# Patient Record
Sex: FEMALE | NOT HISPANIC OR LATINO | Employment: OTHER | URBAN - METROPOLITAN AREA
[De-identification: names, ages, dates, MRNs, and addresses within clinical notes are randomized per-mention and may not be internally consistent; named-entity substitution may affect disease eponyms.]

---

## 2024-08-21 ENCOUNTER — OFFICE VISIT (OUTPATIENT)
Dept: FAMILY MEDICINE | Facility: CLINIC | Age: 69
End: 2024-08-21
Payer: COMMERCIAL

## 2024-08-21 VITALS
BODY MASS INDEX: 20.59 KG/M2 | RESPIRATION RATE: 16 BRPM | WEIGHT: 116.2 LBS | HEIGHT: 63 IN | SYSTOLIC BLOOD PRESSURE: 118 MMHG | TEMPERATURE: 97.6 F | OXYGEN SATURATION: 98 % | DIASTOLIC BLOOD PRESSURE: 76 MMHG | HEART RATE: 69 BPM

## 2024-08-21 DIAGNOSIS — R10.13 EPIGASTRIC PAIN: Primary | ICD-10-CM

## 2024-08-21 DIAGNOSIS — G47.411 NARCOLEPSY WITH CATAPLEXY: ICD-10-CM

## 2024-08-21 DIAGNOSIS — F17.210 CIGARETTE NICOTINE DEPENDENCE WITHOUT COMPLICATION: ICD-10-CM

## 2024-08-21 DIAGNOSIS — J43.9 PULMONARY EMPHYSEMA, UNSPECIFIED EMPHYSEMA TYPE (H): ICD-10-CM

## 2024-08-21 DIAGNOSIS — I51.81 TAKOTSUBO CARDIOMYOPATHY: ICD-10-CM

## 2024-08-21 DIAGNOSIS — E78.00 PURE HYPERCHOLESTEROLEMIA: ICD-10-CM

## 2024-08-21 DIAGNOSIS — Z87.39 HISTORY OF ROTATOR CUFF TEAR: ICD-10-CM

## 2024-08-21 DIAGNOSIS — Z76.89 ENCOUNTER TO ESTABLISH CARE: ICD-10-CM

## 2024-08-21 DIAGNOSIS — G89.29 OTHER CHRONIC PAIN: ICD-10-CM

## 2024-08-21 PROBLEM — M35.9 DISORDER OF CONNECTIVE TISSUE (H): Status: ACTIVE | Noted: 2020-09-09

## 2024-08-21 PROCEDURE — 99204 OFFICE O/P NEW MOD 45 MIN: CPT | Performed by: STUDENT IN AN ORGANIZED HEALTH CARE EDUCATION/TRAINING PROGRAM

## 2024-08-21 RX ORDER — TEMAZEPAM 15 MG/1
15 CAPSULE ORAL
COMMUNITY
Start: 2023-09-11

## 2024-08-21 RX ORDER — ATORVASTATIN CALCIUM 10 MG/1
10 TABLET, FILM COATED ORAL DAILY
Qty: 30 TABLET | Refills: 1 | Status: SHIPPED | OUTPATIENT
Start: 2024-08-21 | End: 2024-09-16

## 2024-08-21 RX ORDER — CARVEDILOL 3.12 MG/1
3.12 TABLET ORAL 2 TIMES DAILY
COMMUNITY
End: 2024-08-21

## 2024-08-21 RX ORDER — CARVEDILOL 3.12 MG/1
3.12 TABLET ORAL 2 TIMES DAILY
Qty: 60 TABLET | Refills: 1 | Status: SHIPPED | OUTPATIENT
Start: 2024-08-21 | End: 2024-09-16

## 2024-08-21 RX ORDER — ATORVASTATIN CALCIUM 10 MG/1
10 TABLET, FILM COATED ORAL DAILY
COMMUNITY
End: 2024-08-21

## 2024-08-21 RX ORDER — ASPIRIN 81 MG/1
81 TABLET, CHEWABLE ORAL DAILY
COMMUNITY

## 2024-08-21 SDOH — HEALTH STABILITY: PHYSICAL HEALTH: ON AVERAGE, HOW MANY DAYS PER WEEK DO YOU ENGAGE IN MODERATE TO STRENUOUS EXERCISE (LIKE A BRISK WALK)?: 2 DAYS

## 2024-08-21 ASSESSMENT — PAIN SCALES - GENERAL: PAINLEVEL: NO PAIN (0)

## 2024-08-21 ASSESSMENT — SOCIAL DETERMINANTS OF HEALTH (SDOH): HOW OFTEN DO YOU GET TOGETHER WITH FRIENDS OR RELATIVES?: TWICE A WEEK

## 2024-08-21 NOTE — PROGRESS NOTES
Assessment & Plan     Epigastric pain  Patient reports chronic epigastric pain and what previously was described as a bulge that appeared with certain movements and had resolved. Previous evaluation by prior primary provider for possible hernia but this was ruled out by subsequent referral. Specialist consult by general surgery was doubtful of potential for hernia given location of symptoms and work up to that time and suggested most likely etiology was related to chest wall. However, GenSurg did suggest persistence or worsening of symptoms to be justification for further imaging and former primary had indicated plan to next refer to GI. Patient now reports similar pressure on opposite side though no noted bulging sensation appreciated and exam is benign. Patient admitted had not pursued follow up after her last interaction with former PCP some time ago so we discussed documented recommendations and patient agreeable to prior suggested course of referral to GI for further work up.  Referral to GI specialist for further evaluation and possible imaging. Aim to complete this before patient leaves for [place] in October if possible as patient leaves for 6 months of the year per her report.   - Adult GI  Referral - Consult Only; Future    Takotsubo cardiomyopathy  Patient had heart failure at age 62, diagnosed with Takotsubo cardiomyopathy. Currently stable on Carvedilol, baby aspirin, and CoQ10.  Refill Carvedilol for two months until next visit.  - carvedilol (COREG) 3.125 MG tablet; Take 1 tablet (3.125 mg) by mouth 2 times daily.    Pure hypercholesterolemia  Patient has a history of high cholesterol currently on a statin  Check cholesterol levels at next visit for annual wellness to ensure patient is on correct dose of medication.  Refill statin for two months until next visit.  - atorvastatin (LIPITOR) 10 MG tablet; Take 1 tablet (10 mg) by mouth daily.    Pulmonary emphysema, unspecified emphysema type  (H)  Cigarette nicotine dependence without complication  Will discuss at AWV, consider cessation and LFT's/LC screens as indicated.    Narcolepsy with cataplexy  Patient has narcolepsy and is seeing a sleep doctor annually.  Continue current management.    History of rotator cuff tear  Other chronic pain  Encounter to establish care  Scheduling for return for AWV.    Nicotine/Tobacco Cessation  She reports that she has been smoking cigarettes. She does not have any smokeless tobacco history on file.  Nicotine/Tobacco Cessation Plan  Will review at AWV.      Counseling  Appropriate preventive services were addressed with this patient via screening, questionnaire, or discussion as appropriate for fall prevention, nutrition, physical activity, Tobacco-use cessation, social engagement, weight loss and cognition.  Checklist reviewing preventive services available has been given to the patient.  Reviewed patient's diet, addressing concerns and/or questions.   She is at risk for lack of exercise and has been provided with information to increase physical activity for the benefit of her well-being.   Discussed possible causes of fatigue. Updated plan of care.  Patient reported difficulty with activities of daily living were addressed today.Addressed any concerns about safety while driving.  Information on urinary incontinence and treatment options given to patient.       Maryellen Fung is a 69 year old, presenting for the following health issues:  Wellness Visit      8/21/2024     9:26 AM   Additional Questions   Roomed by Nata HERRERA   Patient presents to establish care. Visit was schedule as an AWV, however this is not appropriate today both due to the establishing care and as her last Medicare wellness visit was completed in September of 2023 and it would be too early to complete this today.     - Previous care at [facility] in the past    PMH  - HF (Deni LANDRY) at age 62,  "managed with Carvedilol, chewable baby aspirin, and CoQ10. Had been released to primary for continued management given stable control.   - Narcolepsy, managed by a neurologist/sleep doctor, medication listed  - High cholesterol, checked annually  - Chronic pain  - Cigarette smoker, current  - Emphysema  - Connective tissue disorder, thinks this was an incorrect diagnosis, saw rheumatology in the past, states thinks was possibly related to previous physical job  - Now chronic history of a \"bulge\" to R of midline epigastrium causing breathlessness when bending a certain way, started last year and was investigated by prior PCP, thought to have sounded like a hernia, but this was ruled out by GenSurg and suggested to be chest wall related. PCP noted recommendation for referral to GI if persisted or worsened.  - Patient describes now has feeling of pressure on the other side of the abdomen opposite where aforementioned issue was, R sided issue \"resolved\", new issue similar to previous \"bulge\" but without any bulge, so interested in referral based on prior eval and recommendations  - Previous imaging of chest in 2017 showed a pulmonary nodule which has since resolved; a few tiny scattered nodules in both lungs were stable and non-specific, no need for further surveillance of those  - Internal hemorrhoids and diverticulosis diagnosed in 2016 during a colonoscopy; one 4-millimeter polyp in sigmoid colon was removed  - Cologuard last summer 7/6/23 Negative  - Mammogram in 2016; subsequent mammograms done but not specified  - Past rotator cuff tear, no surgery done; feeling twisted since a fall in 2006    Past medical history  - History of heart failure at age 62, diagnosed with Takotsubo cardiomyopathy  - Narcolepsy  - High cholesterol  - Chronic pain  - Emphysema    Social history  - Current smoker  - Travels to [place] for winter months    Current medications  - Carvedilol   - Chewable baby aspirin   - CoQ10   - Temazepam " "(prescribed by sleep doctor)  - Atorvastatin    Lab results  Last cholesterol check was within the last year    Imaging results  - Previous imaging showed a mass in the lung, a nodule, which had since resolved. Tiny scattered nodules in both lungs, stable and non-specific, did not have indication for monitoring.  - Previous colonoscopy in 2016 showed internal hemorrhoids and diverticulosis. One 4-millimeter polyp in sigmoid colon was removed.      Review of Systems  Negative unless otherwise specified per HPI.        Objective    /76   Pulse 69   Temp 97.6  F (36.4  C) (Temporal)   Resp 16   Ht 1.595 m (5' 2.8\")   Wt 52.7 kg (116 lb 3.2 oz)   SpO2 98%   BMI 20.72 kg/m    Body mass index is 20.72 kg/m .  Physical Exam   GENERAL: alert and no acute distress  EYES: Eyes grossly normal to inspection, PERRL and conjunctivae and sclerae normal  HENT: nose and mouth without ulcers or lesions  RESP: normal rate and effort  CV: regular rate, no peripheral edema  ABDOMEN: soft, nontender, non-distended, no bulge appreciated in the epigastrium.  MS: no gross musculoskeletal defects noted, no edema  SKIN: no suspicious lesions or rashes  NEURO: Normal strength and tone, mentation intact and speech normal  PSYCH: mentation appears normal, affect normal/bright        Signed Electronically by: Court Diana, DO  "

## 2024-09-16 ENCOUNTER — OFFICE VISIT (OUTPATIENT)
Dept: FAMILY MEDICINE | Facility: CLINIC | Age: 69
End: 2024-09-16
Payer: COMMERCIAL

## 2024-09-16 VITALS
TEMPERATURE: 96.4 F | SYSTOLIC BLOOD PRESSURE: 132 MMHG | OXYGEN SATURATION: 99 % | HEART RATE: 69 BPM | BODY MASS INDEX: 20.55 KG/M2 | DIASTOLIC BLOOD PRESSURE: 82 MMHG | HEIGHT: 63 IN | WEIGHT: 116 LBS | RESPIRATION RATE: 14 BRPM

## 2024-09-16 DIAGNOSIS — J43.9 PULMONARY EMPHYSEMA, UNSPECIFIED EMPHYSEMA TYPE (H): ICD-10-CM

## 2024-09-16 DIAGNOSIS — Z11.59 NEED FOR HEPATITIS C SCREENING TEST: ICD-10-CM

## 2024-09-16 DIAGNOSIS — Z29.11 NEED FOR VACCINATION AGAINST RESPIRATORY SYNCYTIAL VIRUS: ICD-10-CM

## 2024-09-16 DIAGNOSIS — J01.00 ACUTE MAXILLARY SINUSITIS, RECURRENCE NOT SPECIFIED: ICD-10-CM

## 2024-09-16 DIAGNOSIS — Z28.21 VACCINATION DECLINED: ICD-10-CM

## 2024-09-16 DIAGNOSIS — E78.00 PURE HYPERCHOLESTEROLEMIA: ICD-10-CM

## 2024-09-16 DIAGNOSIS — Z53.20 OSTEOPOROSIS SCREENING DECLINED: ICD-10-CM

## 2024-09-16 DIAGNOSIS — Z23 INFLUENZA VACCINATION ADMINISTERED AT CURRENT VISIT: ICD-10-CM

## 2024-09-16 DIAGNOSIS — Z13.1 SCREENING FOR DIABETES MELLITUS: ICD-10-CM

## 2024-09-16 DIAGNOSIS — J32.9 RECURRENT SINUS INFECTIONS: ICD-10-CM

## 2024-09-16 DIAGNOSIS — Z00.00 ENCOUNTER FOR MEDICARE ANNUAL WELLNESS EXAM: Primary | ICD-10-CM

## 2024-09-16 DIAGNOSIS — I51.81 TAKOTSUBO CARDIOMYOPATHY: ICD-10-CM

## 2024-09-16 DIAGNOSIS — J01.10 ACUTE FRONTAL SINUSITIS, RECURRENCE NOT SPECIFIED: ICD-10-CM

## 2024-09-16 DIAGNOSIS — R73.03 PREDIABETES: ICD-10-CM

## 2024-09-16 DIAGNOSIS — G47.411 NARCOLEPSY WITH CATAPLEXY: ICD-10-CM

## 2024-09-16 PROBLEM — M35.9 DISORDER OF CONNECTIVE TISSUE (H): Status: RESOLVED | Noted: 2020-09-09 | Resolved: 2024-09-16

## 2024-09-16 LAB
CHOLEST SERPL-MCNC: 141 MG/DL
FASTING STATUS PATIENT QL REPORTED: YES
HBA1C MFR BLD: 6 %
HCV AB SERPL QL IA: NONREACTIVE
HDLC SERPL-MCNC: 45 MG/DL
LDLC SERPL CALC-MCNC: 78 MG/DL
NONHDLC SERPL-MCNC: 96 MG/DL
TRIGL SERPL-MCNC: 91 MG/DL

## 2024-09-16 PROCEDURE — 99214 OFFICE O/P EST MOD 30 MIN: CPT | Mod: 25 | Performed by: STUDENT IN AN ORGANIZED HEALTH CARE EDUCATION/TRAINING PROGRAM

## 2024-09-16 PROCEDURE — 80061 LIPID PANEL: CPT | Performed by: STUDENT IN AN ORGANIZED HEALTH CARE EDUCATION/TRAINING PROGRAM

## 2024-09-16 PROCEDURE — 83036 HEMOGLOBIN GLYCOSYLATED A1C: CPT | Performed by: STUDENT IN AN ORGANIZED HEALTH CARE EDUCATION/TRAINING PROGRAM

## 2024-09-16 PROCEDURE — G0008 ADMIN INFLUENZA VIRUS VAC: HCPCS | Performed by: STUDENT IN AN ORGANIZED HEALTH CARE EDUCATION/TRAINING PROGRAM

## 2024-09-16 PROCEDURE — 90662 IIV NO PRSV INCREASED AG IM: CPT | Performed by: STUDENT IN AN ORGANIZED HEALTH CARE EDUCATION/TRAINING PROGRAM

## 2024-09-16 PROCEDURE — G0438 PPPS, INITIAL VISIT: HCPCS | Performed by: STUDENT IN AN ORGANIZED HEALTH CARE EDUCATION/TRAINING PROGRAM

## 2024-09-16 PROCEDURE — 86803 HEPATITIS C AB TEST: CPT | Performed by: STUDENT IN AN ORGANIZED HEALTH CARE EDUCATION/TRAINING PROGRAM

## 2024-09-16 PROCEDURE — 36415 COLL VENOUS BLD VENIPUNCTURE: CPT | Performed by: STUDENT IN AN ORGANIZED HEALTH CARE EDUCATION/TRAINING PROGRAM

## 2024-09-16 RX ORDER — ATORVASTATIN CALCIUM 10 MG/1
10 TABLET, FILM COATED ORAL DAILY
Qty: 30 TABLET | Refills: 11 | Status: SHIPPED | OUTPATIENT
Start: 2024-09-16

## 2024-09-16 RX ORDER — CARVEDILOL 3.12 MG/1
3.12 TABLET ORAL 2 TIMES DAILY
Qty: 30 TABLET | Refills: 11 | Status: SHIPPED | OUTPATIENT
Start: 2024-09-16

## 2024-09-16 ASSESSMENT — PAIN SCALES - GENERAL: PAINLEVEL: NO PAIN (0)

## 2024-09-16 NOTE — PROGRESS NOTES
Preventive Care Visit  [facility]  Court Diana DO, Family Medicine  Sep 16, 2024      Assessment & Plan     Encounter for Medicare annual wellness exam  Recommend annual wellness visits, screens, and immunizations as indicated.     Recurrent sinus infections  Acute maxillary sinusitis, recurrence not specified  Acute frontal sinusitis, recurrence not specified  Given history of rapid progression and recurrence with exam c/w sinus ttp and noted PND and discharge, will treat with extended course. Advised to follow-up PRN if does not resolve or progresses.   - amoxicillin-clavulanate (AUGMENTIN) 875-125 MG tablet; Take 1 tablet by mouth 2 times daily.    Takutsubo cardiomyopathy  BP stable on prior regimen. Will refill for 1 year. Follow-up PRN.  - carvedilol (COREG) 3.125 MG tablet    Pure hypercholesterolemia  Lipid panel well controlled on statin. HDL is low, however. Recommend improve this with exercise and diet.   - atorvastatin (LIPITOR) 10 MG tablet  - Lipid panel reflex to direct LDL Fasting; Future  - Lipid panel reflex to direct LDL Fasting    Pulmonary emphysema, unspecified emphysema type (H)  Referral to pulm for update of PFTs and recs on any necessary management.   - Adult Pulmonary Medicine  Referral; Future    Narcolepsy with cataplexy  Stable on current regimen, though neurology to send trial of alternative medication suggested by insurer that may be covered. Patient to advise if change in regimen determined.     Prediabetes  Screening for diabetes mellitus  A1c elevated to prediabetes range at 6.0. Recommend exercise and avoid excess carbs. Can recheck, but no sooner than 3 months.   - Hemoglobin A1c; Future  - Hemoglobin A1c    Influenza vaccination administered at current visit    Need for hepatitis C screening test  Screen negative.   - Hepatitis C Screen Reflex to HCV RNA Quant and Genotype; Future  - Hepatitis C Screen Reflex to HCV RNA Quant and  Genotype    Need for vaccination against respiratory syncytial virus  Declined, can obtain at pharmacy if desired.     Osteoporosis screening declined  Vaccination declined    Counseling  Appropriate preventive services were addressed with this patient via screening, questionnaire, or discussion as appropriate for fall prevention, nutrition, physical activity, Tobacco-use cessation, social engagement, weight loss and cognition.  Checklist reviewing preventive services available has been given to the patient.  Reviewed patient's diet, addressing concerns and/or questions.   She is at risk for lack of exercise and has been provided with information to increase physical activity for the benefit of her well-being.     Maryellen Fung is a 69 year old, presenting for the following:  Wellness Visit        9/16/2024    10:58 AM   Additional Questions   Roomed by Chasity   Accompanied by        Health Care Directive  Patient does not have a Health Care Directive or Living Will: Discussed advance care planning with patient; information given to patient to review.    HPI    Day 5 of symptoms of sinus HA suggestive of sinus infection.  Has history of recurrent sinus infections.  Very classical for her where she gets a bad smell in her nose and has required antibiotics.  Has received amox and augmentin in the past.  Patient reports if does not get treated promptly evolves quickly into severe sinus infection.     Agreeable to flu vaccination, declines others. Recommended RSV.    Updating lipid screen given hx of HLD. Will advise if needs dose adjustment.     Hx of emphysema, recommending PFTs, patient interested in seeing Pulm for PFTs and any recs. Can be managed by primary after.     Narcolepsy stable on prior meds. Patient states insurance won't cover her Restoril so for now she will pay OOP. Her Neurologist will be sending some alternatives that were suggested by insurance to try. She may swicth if they work.           9/16/2024   General Health   How would you rate your overall physical health? Good   Feel stress (tense, anxious, or unable to sleep) Not at all         9/16/2024   Nutrition   Diet: Regular (no restrictions)          9/16/2024   Exercise   Days per week of moderate/strenous exercise 1 day   Average minutes spent exercising at this level 10 min      (!) EXERCISE CONCERN      9/16/2024   Social Factors   Frequency of gathering with friends or relatives Twice a week   Worry food won't last until get money to buy more No   Food not last or not have enough money for food? No   Do you have housing? (Housing is defined as stable permanent housing and does not include staying ouside in a car, in a tent, in an abandoned building, in an overnight shelter, or couch-surfing.) Yes   Are you worried about losing your housing? No   Lack of transportation? No   Unable to get utilities (heat,electricity)? No         9/16/2024   Fall Risk   Fallen 2 or more times in the past year? No   Trouble with walking or balance? No             9/16/2024   Activities of Daily Living- Home Safety   Needs help with the following daily activites None of the above   Safety concerns in the home None of the above            9/16/2024   Dental   Dentist two times every year? Yes            9/16/2024   Hearing Screening   Hearing concerns? None of the above            9/16/2024   Driving Risk Screening   Patient/family members have concerns about driving No            9/16/2024   General Alertness/Fatigue Screening   Have you been more tired than usual lately? No            9/16/2024   Urinary Incontinence Screening   Bothered by leaking urine in past 6 months No          8/21/2024   TB Screening   Were you born outside of the [place]? No            Today's PHQ-2 Score:       9/16/2024    10:57 AM   PHQ-2 ( 1999 Pf[employer])   Q1: Little interest or pleasure in doing things 0   Q2: Feeling down, depressed or hopeless 0   PHQ-2 Score 0   Q1: Little interest or  pleasure in doing things Not at all   Q2: Feeling down, depressed or hopeless Not at all   PHQ-2 Score 0           9/16/2024   Substance Use   If I could quit smoking, I would Completely agree   I want to quit somking, worry about health affects Completely agree   Willing to make a plan to quit smoking Completely agree   Willing to cut down before quitting Completely agree   Alcohol more than 3/day or more than 7/wk No   Do you have a current opioid prescription? No   How severe/bad is pain from 1 to 10? 0/10 (No Pain)   Do you use any other substances recreationally? No        Social History     Tobacco Use    Smoking status: Every Day     Types: Cigarettes    Tobacco comments:     15 cigarettes per day   Vaping Use    Vaping status: Never Used      Mammogram Screening - Mammogram every 1-2 years updated in Health Maintenance based on mutual decision making  No family or personal history. Last was 2023 and reports was normal. So next recommended in 2025.     History of abnormal Pap smear: No and has aged out, told did not need anymore by prior provider.        ASCVD Risk   The 10-year ASCVD risk score (Jas BLAKE, et al., 2019) is: 11.6%    Values used to calculate the score:      Age: 69 years      Sex: Female      Is Non- : No      Diabetic: No      Tobacco smoker: Yes      Systolic Blood Pressure: 132 mmHg      Is BP treated: No      HDL Cholesterol: 45 mg/dL      Total Cholesterol: 141 mg/dL      Reviewed and updated as needed this visit by Provider   Tobacco  Allergies  Meds  Problems  Med Hx  Surg Hx  Fam Hx     Sexual Activity          History reviewed. No pertinent past medical history.  History reviewed. No pertinent surgical history.  OB History   No obstetric history on file.     Labs reviewed in EPIC  BP Readings from Last 3 Encounters:   09/16/24 132/82   08/21/24 118/76   01/24/12 123/71    Wt Readings from Last 3 Encounters:   09/16/24 52.6 kg (116 lb)    08/21/24 52.7 kg (116 lb 3.2 oz)   01/24/12 56.2 kg (124 lb)                  Patient Active Problem List   Diagnosis    Narcolepsy with cataplexy    Chronic pain    Cigarette nicotine dependence without complication    Pulmonary emphysema (H)    Takotsubo cardiomyopathy    Pure hypercholesterolemia    History of rotator cuff tear    Epigastric pain    Lung mass     History reviewed. No pertinent surgical history.    Social History     Tobacco Use    Smoking status: Every Day     Types: Cigarettes    Smokeless tobacco: Not on file    Tobacco comments:     15 cigarettes per day   Substance Use Topics    Alcohol use: Not on file     History reviewed. No pertinent family history.      Current Outpatient Medications   Medication Sig Dispense Refill    amoxicillin-clavulanate (AUGMENTIN) 875-125 MG tablet Take 1 tablet by mouth 2 times daily. 20 tablet 0    aspirin (ASA) 81 MG chewable tablet Take 81 mg by mouth daily.      atorvastatin (LIPITOR) 10 MG tablet Take 1 tablet (10 mg) by mouth daily. 30 tablet 11    carvedilol (COREG) 3.125 MG tablet Take 1 tablet (3.125 mg) by mouth 2 times daily. 30 tablet 11    temazepam (RESTORIL) 15 MG capsule Take 15 mg by mouth nightly as needed for sleep.       Allergies   Allergen Reactions    Clomipramine     Imipramine     Methylphenidate     No Clinical Screening - See Comments      Nuvigil, venlafaxine,    Sulfa Antibiotics     Vivactil [Protriptyline Hcl]      Recent Labs   Lab Test 09/16/24  1207   A1C 6.0*   LDL 78   HDL 45*   TRIG 91      Current providers sharing in care for this patient include:  Patient Care Team:  No Ref-Primary, Physician as PCP - Court Hernandez DO as Assigned PCP    The following health maintenance items are reviewed in Epic and correct as of today:  Health Maintenance   Topic Date Due    DEXA  Never done    SPIROMETRY  Never done    COPD ACTION PLAN  Never done    GLUCOSE  Never done    RSV VACCINE (1 - Risk 60-74 years 1-dose series) Never  "done    LUNG CANCER SCREENING  02/19/2018    COVID-19 Vaccine (4 - 2024-25 season) 09/01/2024    Pneumococcal Vaccine: 65+ Years (3 of 3 - PPSV23 or PCV20) 10/28/2024    MAMMO SCREENING  06/01/2025    NICOTINE/TOBACCO CESSATION COUNSELING Q 1 YR  08/21/2025    ANNUAL REVIEW OF HM ORDERS  08/21/2025    MEDICARE ANNUAL WELLNESS VISIT  09/16/2025    LIPID  09/16/2025    FALL RISK ASSESSMENT  09/16/2025    COLORECTAL CANCER SCREENING  12/28/2026    ADVANCE CARE PLANNING  09/16/2029    DTAP/TDAP/TD IMMUNIZATION (3 - Td or Tdap) 09/09/2030    HEPATITIS C SCREENING  Completed    PHQ-2 (once per calendar year)  Completed    INFLUENZA VACCINE  Completed    ZOSTER IMMUNIZATION  Completed    HPV IMMUNIZATION  Aged Out    MENINGITIS IMMUNIZATION  Aged Out    RSV MONOCLONAL ANTIBODY  Aged Out         Review of Systems  Negative unless otherwise specified per HPI.       Objective    Exam  /82   Pulse 69   Temp (!) 96.4  F (35.8  C) (Temporal)   Resp 14   Ht 1.6 m (5' 2.99\")   Wt 52.6 kg (116 lb)   LMP  (LMP Unknown)   SpO2 99%   Breastfeeding No   BMI 20.55 kg/m     Estimated body mass index is 20.55 kg/m  as calculated from the following:    Height as of this encounter: 1.6 m (5' 2.99\").    Weight as of this encounter: 52.6 kg (116 lb).    Physical Exam  GENERAL: alert and no distress  EYES: Eyes grossly normal to inspection, PERRL and conjunctivae and sclerae normal  HENT: ear canals and TM's normal, nose and mouth without ulcers or lesions, ttp overlying frontal and maxillary sinuses b/l, mild nasal erythema w/o significant discharge, post nasal drip noted in posterior oropharynx without notable erythema of pharynx or tonsils, no tonsillar swelling.   NECK: no adenopathy, no asymmetry, masses, or scars  RESP: lungs clear to auscultation - no rales, rhonchi or wheezes, normal rate and effort  CV: regular rate, no peripheral edema  ABDOMEN: soft, nontender, no hepatosplenomegaly, no masses and bowel sounds " normal  MS: no gross musculoskeletal defects noted, no edema  SKIN: no suspicious lesions or rashes  NEURO: Normal strength and tone, mentation intact and speech normal  PSYCH: mentation appears normal, affect normal/bright        9/16/2024   Mini Cog   Clock Draw Score 2 Normal   3 Item Recall 3 objects recalled   Mini Cog Total Score 5             Signed Electronically by: Court Diana DO

## 2024-09-17 ENCOUNTER — TELEPHONE (OUTPATIENT)
Dept: FAMILY MEDICINE | Facility: CLINIC | Age: 69
End: 2024-09-17
Payer: COMMERCIAL

## 2024-09-17 NOTE — TELEPHONE ENCOUNTER
----- Message from Court Diana sent at 9/16/2024  8:03 PM CDT -----  Team - please call patient with results.    Cholesterol looks good aside from low HDL. Exercise can bring that up, but can also try to add or focus on foods in diet that provide a good source for this such as avocado, nuts, oatmeal, and olive oil.    A1c is elevated to prediabetes range. Exercise can also help with this, but also recommend avoiding excess carbs in diet.    Negative screen for hepatitis C.

## 2024-09-17 NOTE — PATIENT INSTRUCTIONS
Patient Education   Preventive Care Advice   This is general advice given by our system to help you stay healthy. However, your care team may have specific advice just for you. Please talk to your care team about your preventive care needs.  Nutrition  Eat 5 or more servings of fruits and vegetables each day.  Try wheat bread, brown rice and whole grain pasta (instead of white bread, rice, and pasta).  Get enough calcium and vitamin D. Check the label on foods and aim for 100% of the RDA (recommended daily allowance).  Lifestyle  Exercise at least 150 minutes each week  (30 minutes a day, 5 days a week).  Do muscle strengthening activities 2 days a week. These help control your weight and prevent disease.  No smoking.  Wear sunscreen to prevent skin cancer.  Have a dental exam and cleaning every 6 months.  Yearly exams  See your health care team every year to talk about:  Any changes in your health.  Any medicines your care team has prescribed.  Preventive care, family planning, and ways to prevent chronic diseases.  Shots (vaccines)   HPV shots (up to age 26), if you've never had them before.  Hepatitis B shots (up to age 59), if you've never had them before.  COVID-19 shot: Get this shot when it's due.  Flu shot: Get a flu shot every year.  Tetanus shot: Get a tetanus shot every 10 years.  Pneumococcal, hepatitis A, and RSV shots: Ask your care team if you need these based on your risk.  Shingles shot (for age 50 and up)  General health tests  Diabetes screening:  Starting at age 35, Get screened for diabetes at least every 3 years.  If you are younger than age 35, ask your care team if you should be screened for diabetes.  Cholesterol test: At age 39, start having a cholesterol test every 5 years, or more often if advised.  Bone density scan (DEXA): At age 50, ask your care team if you should have this scan for osteoporosis (brittle bones).  Hepatitis C: Get tested at least once in your life.  STIs (sexually  transmitted infections)  Before age 24: Ask your care team if you should be screened for STIs.  After age 24: Get screened for STIs if you're at risk. You are at risk for STIs (including HIV) if:  You are sexually active with more than one person.  You don't use condoms every time.  You or a partner was diagnosed with a sexually transmitted infection.  If you are at risk for HIV, ask about PrEP medicine to prevent HIV.  Get tested for HIV at least once in your life, whether you are at risk for HIV or not.  Cancer screening tests  Cervical cancer screening: If you have a cervix, begin getting regular cervical cancer screening tests starting at age 21.  Breast cancer scan (mammogram): If you've ever had breasts, begin having regular mammograms starting at age 40. This is a scan to check for breast cancer.  Colon cancer screening: It is important to start screening for colon cancer at age 45.  Have a colonoscopy test every 10 years (or more often if you're at risk) Or, ask your provider about stool tests like a FIT test every year or Cologuard test every 3 years.  To learn more about your testing options, visit:   .  For help making a decision, visit:   https://bit.ly/eu82986.  Prostate cancer screening test: If you have a prostate, ask your care team if a prostate cancer screening test (PSA) at age 55 is right for you.  Lung cancer screening: If you are a current or former smoker ages 50 to 80, ask your care team if ongoing lung cancer screenings are right for you.  For informational purposes only. Not to replace the advice of your health care provider. Copyright   2023 [facility]. All rights reserved. Clinically reviewed by the [facility]. [employer] [id number] - REV 01/24.  Learning About Being Physically Active  What is physical activity?     Being physically active means doing any kind of activity that gets your body moving.  The types of physical activity that can help you get  "fit and stay healthy include:  Aerobic or \"cardio\" activities. These make your heart beat faster and make you breathe harder, such as brisk walking, riding a bike, or running. They strengthen your heart and lungs and build up your endurance.  Strength training activities. These make your muscles work against, or \"resist,\" something. Examples include lifting weights or doing push-ups. These activities help tone and strengthen your muscles and bones.  Stretches. These let you move your joints and muscles through their full range of motion. Stretching helps you be more flexible.  Reaching a balance between these three types of physical activity is important because each one contributes to your overall fitness.  What are the benefits of being active?  Being active is one of the best things you can do for your health. It helps you to:  Feel stronger and have more energy to do all the things you like to do.  Focus better at school or work.  Feel, think, and sleep better.  Reach and stay at a healthy weight.  Lose fat and build lean muscle.  Lower your risk for serious health problems, including diabetes, heart attack, high blood pressure, and some cancers.  Keep your heart, lungs, bones, muscles, and joints strong and healthy.  How can you make being active part of your life?  Start slowly. Make it your long-term goal to get at least 30 minutes of exercise on most days of the week. Walking is a good choice. You also may want to do other activities, such as running, swimming, cycling, or playing tennis or team sports.  Pick activities that you like--ones that make your heart beat faster, your muscles stronger, and your muscles and joints more flexible. If you find more than one thing you like doing, do them all. You don't have to do the same thing every day.  Get your heart pumping every day. Any activity that makes your heart beat faster and keeps it at that rate for a while counts.  Here are some great ways to get your " "heart beating faster:  Go for a brisk walk, run, or hike.  Go for a swim or bike ride.  Take an online exercise class or dance.  Play a game of touch football, basketball, or soccer.  Play tennis, pickleball, or racquetball.  Climb stairs.  Even some household chores can be aerobic. Just do them at a faster pace. Raking or mowing the lawn, sweeping the garage, and vacuuming and cleaning your home all can help get your heart rate up.  Strengthen your muscles during the week. You don't have to lift heavy weights or grow big, bulky muscles to get stronger. Doing a few simple activities that make your muscles work against, or \"resist,\" something can help you get stronger. Aim for at least twice a week.  For example, you can:  Do push-ups or sit-ups, which use your own body weight as resistance.  Lift weights or dumbbells or use stretch bands at home or in a gym or community center.  Stretch your muscles often. Stretching will help you as you become more active. It can help you stay flexible and loosen tight muscles. It can also help improve your balance and posture and can be a great way to relax.  Be sure to stretch the muscles you'll be using when you work out. It's best to warm your muscles slightly before you stretch them. Walk or do some other light aerobic activity for a few minutes. Then start stretching.  When you stretch your muscles:  Do it slowly. Stretching is not about going fast or making sudden movements.  Don't push or bounce during a stretch.  Hold each stretch for at least 15 to 30 seconds, if you can. You should feel a stretch in the muscle, but not pain.  Breathe out as you do the stretch. Then breathe in as you hold the stretch. Don't hold your breath.  If you're worried about how more activity might affect your health, have a checkup before you start. Follow any special advice your doctor gives you for getting a smart start.  Where can you learn more?  Go to " "https://www.PeopleAdmin.net/patiented  Enter W332 in the search box to learn more about \"Learning About Being Physically Active.\"  Current as of: June 5, 2023  Content Version: 14.1 2006-2024 [employer].   Care instructions adapted under license by your healthcare professional. If you have questions about a medical condition or this instruction, always ask your healthcare professional. [employer] disclaims any warranty or liability for your use of this information.    Learning About Being Active as an Older Adult  Why is being active important as you get older?     Being active is one of the best things you can do for your health. And it's never too late to start. Being active--or getting active, if you aren't already--has definite benefits. It can:  Give you more energy,  Keep your mind sharp.  Improve balance to reduce your risk of falls.  Help you manage chronic illness with fewer medicines.  No matter how old you are, how fit you are, or what health problems you have, there is a form of activity that will work for you. And the more physical activity you can do, the better your overall health will be.  What kinds of activity can help you stay healthy?  Being more active will make your daily activities easier. Physical activity includes planned exercise and things you do in daily life. There are four types of activity:  Aerobic.  Doing aerobic activity makes your heart and lungs strong.  Includes walking, dancing, and gardening.  Aim for at least 2  hours spread throughout the week.  It improves your energy and can help you sleep better.  Muscle-strengthening.  This type of activity can help maintain muscle and strengthen bones.  Includes climbing stairs, using resistance bands, and lifting or carrying heavy loads.  Aim for at least twice a week.  It can help protect the knees and other joints.  Stretching.  Stretching gives you better range of motion in joints and muscles.  Includes " upper arm stretches, calf stretches, and gentle yoga.  Aim for at least twice a week, preferably after your muscles are warmed up from other activities.  It can help you function better in daily life.  Balancing.  This helps you stay coordinated and have good posture.  Includes heel-to-toe walking, steffen chi, and certain types of yoga.  Aim for at least 3 days a week.  It can reduce your risk of falling.  Even if you have a hard time meeting the recommendations, it's better to be more active than less active. All activity done in each category counts toward your weekly total. You'd be surprised how daily things like carrying groceries, keeping up with grandchildren, and taking the stairs can add up.  What keeps you from being active?  If you've had a hard time being more active, you're not alone. Maybe you remember being able to do more. Or maybe you've never thought of yourself as being active. It's frustrating when you can't do the things you want. Being more active can help. What's holding you back?  Getting started.  Have a goal, but break it into easy tasks. Small steps build into big accomplishments.  Staying motivated.  If you feel like skipping your activity, remember your goal. Maybe you want to move better and stay independent. Every activity gets you one step closer.  Not feeling your best.  Start with 5 minutes of an activity you enjoy. Prove to yourself you can do it. As you get comfortable, increase your time.  You may not be where you want to be. But you're in the process of getting there. Everyone starts somewhere.  How can you find safe ways to stay active?  Talk with your doctor about any physical challenges you're facing. Make a plan with your doctor if you have a health problem or aren't sure how to get started with activity.  If you're already active, ask your doctor if there is anything you should change to stay safe as your body and health change.  If you tend to feel dizzy after you take  "medicine, avoid activity at that time. Try being active before you take your medicine. This will reduce your risk of falls.  If you plan to be active at home, make sure to clear your space before you get started. Remove things like TV cords, coffee tables, and throw rugs. It's safest to have plenty of space to move freely.  The key to getting more active is to take it slow and steady. Try to improve only a little bit at a time. Pick just one area to improve on at first. And if an activity hurts, stop and talk to your doctor.  Where can you learn more?  Go to https://www.[employer].net/patiented  Enter P600 in the search box to learn more about \"Learning About Being Active as an Older Adult.\"  Current as of: June 5, 2023  Content Version: 14.1 2006-2024 [employer].   Care instructions adapted under license by your healthcare professional. If you have questions about a medical condition or this instruction, always ask your healthcare professional. [employer] disclaims any warranty or liability for your use of this information.    Eating Healthy Foods: Care Instructions  With every meal, you can make healthy food choices. Try to eat a variety of fruits, vegetables, whole grains, lean proteins, and low-fat dairy products. This can help you get the right balance of nutrients, including vitamins and minerals. Small changes add up over time. You can start by adding one healthy food to your meals each day.    Try to make half your plate fruits and vegetables, one-fourth whole grains, and one-fourth lean proteins. Try including dairy with your meals.   Eat more fruits and vegetables. Try to have them with most meals and snacks.   Foods for healthy eating    Fruits    These can be fresh, frozen, canned, or dried.  Try to choose whole fruit rather than fruit juice.  Eat a variety of colors.    Vegetables    These can be fresh, frozen, canned, or dried.  Beans, peas, and lentils count too.    " "Whole grains    Choose whole-grain breads, cereals, and noodles.  Try brown rice.    Lean proteins    These can include lean meat, poultry, fish, and eggs.  You can also have tofu, beans, peas, lentils, nuts, and seeds.    Dairy    Try milk, yogurt, and cheese.  Choose low-fat or fat-free when you can.  If you need to, use lactose-free milk or fortified plant-based milk products, such as soy milk.    Water    Drink water when you're thirsty.  Limit sugar-sweetened drinks, including soda, fruit drinks, and sports drinks.  Where can you learn more?  Go to https://www.[employer].net/patiented  Enter T756 in the search box to learn more about \"Eating Healthy Foods: Care Instructions.\"  Current as of: September 20, 2023               Content Version: 14.0    2949-6315 [employer].   Care instructions adapted under license by your healthcare professional. If you have questions about a medical condition or this instruction, always ask your healthcare professional. [employer] disclaims any warranty or liability for your use of this information.      Nutrition for Older Adults: Care Instructions  Overview     Good nutrition is important at any age. But it is especially important for older adults. Eating healthy foods helps keep your body strong. And it can help lower your risk for disease.  As you get older, your body needs more of certain nutrients. These include vitamin B12, calcium, and vitamin D. But it may be harder for you to get these and other important nutrients. This could be for many reasons. You may not feel as hungry as you used to. Or you could have problems with your teeth or mouth that make it hard to chew. Or you may not enjoy planning and preparing meals, especially if you live alone.  Talk with your doctor if you want help getting the most nutrition from what you eat. They may have you work with a dietitian to help you plan meals.  Follow-up care is a key part of your treatment " and safety. Be sure to make and go to all appointments, and call your doctor if you are having problems. It's also a good idea to know your test results and keep a list of the medicines you take.  How can you care for yourself at home?  To stay healthy  Eat a variety of foods. The more you vary the foods you eat, the more vitamins, minerals, and other nutrients you get.  Ask your doctor if you should take a multivitamin. Choose one with about 100% of the daily value (DV) for vitamins and minerals. Do not take more than 100% of the daily value for any vitamin or mineral unless your doctor tells you to. Talk with your doctor if you are not sure which multivitamin is right for you.  Try to eat lots of fruits and vegetables. Fresh or frozen vegetables and fruits are healthy choices. Choose canned vegetables that have no salt added and fruits that are canned in their own juice or light syrup.  Include foods that are high in vitamin B12 in your diet. Good choices are fortified breakfast cereal, nonfat or low-fat milk and other dairy products, meat, poultry, fish, and eggs.  Get enough calcium and vitamin D. Good choices include nonfat or low-fat milk, cheese, and yogurt. Other good options are tofu, orange juice with added calcium, and some leafy green vegetables, such as christin greens and kale. If you don't use milk products, talk to your doctor about calcium and vitamin D supplements.  Try to eat protein foods every day. Good choices include lean meat, fish, poultry, eggs, and cheese. Other good options are cooked beans, peanut butter, and nuts and seeds.  Choose whole grains for half of the grains you eat. Look for 100% whole wheat bread, whole-grain cereals, brown rice, and other whole grains.  If you have constipation  Eat high-fiber foods every day if you can. These include fruits, vegetables, cooked dried beans, and whole grains.  Drink plenty of fluids. If you have kidney, heart, or liver disease and have to  limit fluids, talk with your doctor before you increase the amount of fluids you drink.  Ask your doctor if stool softeners may help keep your bowels regular.  If you have mouth problems that make chewing hard  Pick canned or cooked fruits and vegetables. These are often softer.  Chop or shred meat, poultry, and fish. Add sauce or gravy to the meat to help keep it moist.  Pick other protein foods that are soft. These include cheese, peanut butter, cooked beans, cottage cheese, and eggs.  If you have trouble shopping for yourself  Ask a local food store to deliver groceries to your home.  Contact your local area agency on aging and ask about resources that can help.  Ask a family member or neighbor to help you.  If you have trouble preparing meals  Try easier cooking methods such as using a slow cooker or microwave oven.  Let the grocery store do some of the work for you. Look for precut, washed, and ready-to-eat foods.  Take part in group meal programs. You can find these through senior citizen programs.  Have meals brought to your home. Your community may offer programs that deliver meals, such as Meals on Wheels. Or you could use an online meal delivery service.  If you are able, take a cooking class.  If your appetite is poor  Try to eat meals on a regular schedule. It may help to eat smaller meals more often throughout the day.  If you can, eat some meals with other people. You could ask family or friends to eat with you. Or you could take part in group meal programs offered in your community.  Ask your doctor if your medicines could cause appetite or taste problems. If so, ask about changing medicines.  Add spices and herbs to increase the flavor of food.  If you think you are depressed, ask your doctor for help. Depression can affect your appetite. And it can make it hard to do everyday activities like grocery shopping and making meals. Treatment can help.  When should you call for help?  Watch closely for  "changes in your health, and be sure to contact your doctor if you have any problems.  Where can you learn more?  Go to https://www.[employer].net/patiented  Enter L643 in the search box to learn more about \"Nutrition for Older Adults: Care Instructions.\"  Current as of: September 25, 2023               Content Version: 14.0    2517-2170 [employer].   Care instructions adapted under license by your healthcare professional. If you have questions about a medical condition or this instruction, always ask your healthcare professional. [employer] disclaims any warranty or liability for your use of this information.         "

## 2024-09-17 NOTE — TELEPHONE ENCOUNTER
Patient returning call. Relayed providers update. Verbalized understanding.     Ronnie Bentley RN on 9/17/2024 at 10:21 AM

## 2024-10-15 ENCOUNTER — DOCUMENTATION ONLY (OUTPATIENT)
Dept: OTHER | Facility: CLINIC | Age: 69
End: 2024-10-15
Payer: COMMERCIAL

## 2025-06-05 DIAGNOSIS — E78.00 PURE HYPERCHOLESTEROLEMIA: ICD-10-CM

## 2025-06-05 RX ORDER — ATORVASTATIN CALCIUM 10 MG/1
10 TABLET, FILM COATED ORAL
Qty: 90 TABLET | Refills: 0 | Status: SHIPPED | OUTPATIENT
Start: 2025-06-05

## 2025-06-10 ENCOUNTER — TELEPHONE (OUTPATIENT)
Dept: PULMONOLOGY | Facility: CLINIC | Age: 70
End: 2025-06-10
Payer: COMMERCIAL

## 2025-06-10 DIAGNOSIS — R91.8 LUNG MASS: ICD-10-CM

## 2025-06-10 DIAGNOSIS — J43.9 PULMONARY EMPHYSEMA (H): Primary | ICD-10-CM

## 2025-06-10 NOTE — TELEPHONE ENCOUNTER
Orders for PFTs were signed. Patient will also need a chest XR prior to pulmonology appointment. Orders placed.     Kiera Mejia RN on 6/10/2025 at 9:47 AM

## 2025-06-10 NOTE — TELEPHONE ENCOUNTER
[facility] Call Center    Phone Message    May a detailed message be left on voicemail: yes     Reason for Call: Appointment Intake    Referring Provider Name: Dr. Court Diana  Diagnosis and/or Symptoms: COPD    Pt is scheduled to establish care 9/9/25 with Dr. Fleming, will have PFT's done 9/3/25-needs orders placed for PFT's (pended).    Action Taken: Other: Pulm    Travel Screening: Not Applicable

## 2025-08-20 DIAGNOSIS — I51.81 TAKOTSUBO CARDIOMYOPATHY: ICD-10-CM

## 2025-08-20 RX ORDER — CARVEDILOL 3.12 MG/1
3.12 TABLET ORAL
Qty: 180 TABLET | Refills: 1 | Status: SHIPPED | OUTPATIENT
Start: 2025-08-20

## 2025-09-02 ENCOUNTER — HOSPITAL ENCOUNTER (OUTPATIENT)
Dept: GENERAL RADIOLOGY | Facility: CLINIC | Age: 70
Discharge: HOME | End: 2025-09-02
Attending: INTERNAL MEDICINE
Payer: COMMERCIAL

## 2025-09-02 DIAGNOSIS — R91.8 LUNG MASS: ICD-10-CM

## 2025-09-02 DIAGNOSIS — J43.9 PULMONARY EMPHYSEMA (H): ICD-10-CM

## 2025-09-02 PROCEDURE — 71046 X-RAY EXAM CHEST 2 VIEWS: CPT

## 2025-09-03 ENCOUNTER — TELEPHONE (OUTPATIENT)
Dept: PULMONOLOGY | Facility: CLINIC | Age: 70
End: 2025-09-03
Payer: COMMERCIAL

## 2025-09-03 DIAGNOSIS — J43.9 PULMONARY EMPHYSEMA, UNSPECIFIED EMPHYSEMA TYPE (H): Primary | ICD-10-CM
